# Patient Record
Sex: FEMALE | Race: WHITE | Employment: FULL TIME | URBAN - METROPOLITAN AREA
[De-identification: names, ages, dates, MRNs, and addresses within clinical notes are randomized per-mention and may not be internally consistent; named-entity substitution may affect disease eponyms.]

---

## 2021-08-05 ENCOUNTER — APPOINTMENT (OUTPATIENT)
Dept: CT IMAGING | Facility: CLINIC | Age: 21
End: 2021-08-05
Attending: EMERGENCY MEDICINE
Payer: COMMERCIAL

## 2021-08-05 ENCOUNTER — HOSPITAL ENCOUNTER (EMERGENCY)
Facility: CLINIC | Age: 21
Discharge: HOME | End: 2021-08-06
Attending: EMERGENCY MEDICINE | Admitting: EMERGENCY MEDICINE
Payer: COMMERCIAL

## 2021-08-05 VITALS
DIASTOLIC BLOOD PRESSURE: 92 MMHG | RESPIRATION RATE: 18 BRPM | SYSTOLIC BLOOD PRESSURE: 144 MMHG | OXYGEN SATURATION: 97 % | TEMPERATURE: 98.7 F | HEART RATE: 99 BPM

## 2021-08-05 DIAGNOSIS — R41.89 DECREASED RESPONSIVENESS: ICD-10-CM

## 2021-08-05 DIAGNOSIS — R42 DIZZINESS: ICD-10-CM

## 2021-08-05 DIAGNOSIS — R56.9 SEIZURE-LIKE ACTIVITY (H): ICD-10-CM

## 2021-08-05 DIAGNOSIS — R25.1 EPISODE OF SHAKING: ICD-10-CM

## 2021-08-05 LAB
AMPHETAMINES UR QL SCN: ABNORMAL
ANION GAP SERPL CALCULATED.3IONS-SCNC: 6 MMOL/L (ref 3–14)
ATRIAL RATE - MUSE: 109 BPM
BARBITURATES UR QL: ABNORMAL
BASOPHILS # BLD AUTO: 0.1 10E3/UL (ref 0–0.2)
BASOPHILS NFR BLD AUTO: 1 %
BENZODIAZ UR QL: ABNORMAL
BUN SERPL-MCNC: 10 MG/DL (ref 7–30)
CALCIUM SERPL-MCNC: 9.1 MG/DL (ref 8.5–10.1)
CANNABINOIDS UR QL SCN: ABNORMAL
CHLORIDE BLD-SCNC: 103 MMOL/L (ref 94–109)
CO2 SERPL-SCNC: 27 MMOL/L (ref 20–32)
COCAINE UR QL: ABNORMAL
CREAT SERPL-MCNC: 0.89 MG/DL (ref 0.52–1.04)
DIASTOLIC BLOOD PRESSURE - MUSE: NORMAL MMHG
EOSINOPHIL # BLD AUTO: 0.2 10E3/UL (ref 0–0.7)
EOSINOPHIL NFR BLD AUTO: 2 %
ERYTHROCYTE [DISTWIDTH] IN BLOOD BY AUTOMATED COUNT: 13.7 % (ref 10–15)
ETHANOL SERPL-MCNC: <0.01 G/DL
GFR SERPL CREATININE-BSD FRML MDRD: >90 ML/MIN/1.73M2
GLUCOSE BLD-MCNC: 96 MG/DL (ref 70–99)
HCG SERPL QL: NEGATIVE
HCT VFR BLD AUTO: 37.8 % (ref 35–47)
HGB BLD-MCNC: 11.6 G/DL (ref 11.7–15.7)
HOLD SPECIMEN: NORMAL
IMM GRANULOCYTES # BLD: 0.1 10E3/UL
IMM GRANULOCYTES NFR BLD: 1 %
INTERPRETATION ECG - MUSE: NORMAL
LYMPHOCYTES # BLD AUTO: 3.2 10E3/UL (ref 0.8–5.3)
LYMPHOCYTES NFR BLD AUTO: 24 %
MCH RBC QN AUTO: 24 PG (ref 26.5–33)
MCHC RBC AUTO-ENTMCNC: 30.7 G/DL (ref 31.5–36.5)
MCV RBC AUTO: 78 FL (ref 78–100)
MONOCYTES # BLD AUTO: 1 10E3/UL (ref 0–1.3)
MONOCYTES NFR BLD AUTO: 8 %
NEUTROPHILS # BLD AUTO: 8.6 10E3/UL (ref 1.6–8.3)
NEUTROPHILS NFR BLD AUTO: 64 %
NRBC # BLD AUTO: 0 10E3/UL
NRBC BLD AUTO-RTO: 0 /100
OPIATES UR QL SCN: ABNORMAL
P AXIS - MUSE: 46 DEGREES
PCP UR QL SCN: ABNORMAL
PLATELET # BLD AUTO: 393 10E3/UL (ref 150–450)
POTASSIUM BLD-SCNC: 3.6 MMOL/L (ref 3.4–5.3)
PR INTERVAL - MUSE: 128 MS
QRS DURATION - MUSE: 88 MS
QT - MUSE: 356 MS
QTC - MUSE: 479 MS
R AXIS - MUSE: 50 DEGREES
RBC # BLD AUTO: 4.83 10E6/UL (ref 3.8–5.2)
SODIUM SERPL-SCNC: 136 MMOL/L (ref 133–144)
SYSTOLIC BLOOD PRESSURE - MUSE: NORMAL MMHG
T AXIS - MUSE: 40 DEGREES
TSH SERPL DL<=0.005 MIU/L-ACNC: 2.1 MU/L (ref 0.4–4)
VENTRICULAR RATE- MUSE: 109 BPM
WBC # BLD AUTO: 13.1 10E3/UL (ref 4–11)

## 2021-08-05 PROCEDURE — 96360 HYDRATION IV INFUSION INIT: CPT

## 2021-08-05 PROCEDURE — 84703 CHORIONIC GONADOTROPIN ASSAY: CPT | Performed by: EMERGENCY MEDICINE

## 2021-08-05 PROCEDURE — 80307 DRUG TEST PRSMV CHEM ANLYZR: CPT | Performed by: EMERGENCY MEDICINE

## 2021-08-05 PROCEDURE — 96361 HYDRATE IV INFUSION ADD-ON: CPT

## 2021-08-05 PROCEDURE — 85025 COMPLETE CBC W/AUTO DIFF WBC: CPT | Performed by: EMERGENCY MEDICINE

## 2021-08-05 PROCEDURE — 70450 CT HEAD/BRAIN W/O DYE: CPT

## 2021-08-05 PROCEDURE — 82077 ASSAY SPEC XCP UR&BREATH IA: CPT | Performed by: EMERGENCY MEDICINE

## 2021-08-05 PROCEDURE — 93005 ELECTROCARDIOGRAM TRACING: CPT

## 2021-08-05 PROCEDURE — 84443 ASSAY THYROID STIM HORMONE: CPT | Performed by: EMERGENCY MEDICINE

## 2021-08-05 PROCEDURE — 80048 BASIC METABOLIC PNL TOTAL CA: CPT | Performed by: EMERGENCY MEDICINE

## 2021-08-05 PROCEDURE — 99285 EMERGENCY DEPT VISIT HI MDM: CPT | Mod: 25

## 2021-08-05 PROCEDURE — 250N000013 HC RX MED GY IP 250 OP 250 PS 637: Performed by: EMERGENCY MEDICINE

## 2021-08-05 PROCEDURE — 258N000003 HC RX IP 258 OP 636: Performed by: EMERGENCY MEDICINE

## 2021-08-05 PROCEDURE — 36415 COLL VENOUS BLD VENIPUNCTURE: CPT | Performed by: EMERGENCY MEDICINE

## 2021-08-05 RX ORDER — CLONAZEPAM 0.5 MG/1
0.5 TABLET ORAL ONCE
Status: COMPLETED | OUTPATIENT
Start: 2021-08-05 | End: 2021-08-05

## 2021-08-05 RX ORDER — MECLIZINE HYDROCHLORIDE 25 MG/1
25 TABLET ORAL ONCE
Status: COMPLETED | OUTPATIENT
Start: 2021-08-05 | End: 2021-08-05

## 2021-08-05 RX ORDER — FLUOXETINE 40 MG/1
40 CAPSULE ORAL DAILY
COMMUNITY

## 2021-08-05 RX ORDER — DEXTROAMPHETAMINE SACCHARATE, AMPHETAMINE ASPARTATE, DEXTROAMPHETAMINE SULFATE AND AMPHETAMINE SULFATE 7.5; 7.5; 7.5; 7.5 MG/1; MG/1; MG/1; MG/1
35 TABLET ORAL DAILY
COMMUNITY

## 2021-08-05 RX ORDER — NORGESTIMATE AND ETHINYL ESTRADIOL 7DAYSX3 28
1 KIT ORAL DAILY
COMMUNITY

## 2021-08-05 RX ORDER — MECLIZINE HYDROCHLORIDE 25 MG/1
25-50 TABLET ORAL 4 TIMES DAILY PRN
Qty: 20 TABLET | Refills: 0 | Status: SHIPPED | OUTPATIENT
Start: 2021-08-05

## 2021-08-05 RX ORDER — TRAZODONE HYDROCHLORIDE 100 MG/1
100 TABLET ORAL AT BEDTIME
COMMUNITY

## 2021-08-05 RX ADMIN — MECLIZINE HYDROCHLORIDE 25 MG: 25 TABLET ORAL at 23:26

## 2021-08-05 RX ADMIN — CLONAZEPAM 0.5 MG: 0.5 TABLET ORAL at 21:41

## 2021-08-05 RX ADMIN — CLONAZEPAM 0.5 MG: 0.5 TABLET ORAL at 23:26

## 2021-08-05 RX ADMIN — SODIUM CHLORIDE 1000 ML: 9 INJECTION, SOLUTION INTRAVENOUS at 23:27

## 2021-08-05 RX ADMIN — SODIUM CHLORIDE 1000 ML: 9 INJECTION, SOLUTION INTRAVENOUS at 21:33

## 2021-08-05 ASSESSMENT — ENCOUNTER SYMPTOMS
DYSURIA: 0
PALPITATIONS: 1
HEMATURIA: 0
DIZZINESS: 1
FEVER: 0

## 2021-08-06 NOTE — ED PROVIDER NOTES
History   Chief Complaint:  Dizziness       HPI   Gisselle Buckner is a 21 year old female with history of anxiety, depression and panic attack's who presents with dizziness and altered mental status. The patient reports that she was at a restaurant with her parents when she began to feel dizzy and feel like her upper body was heavy. She reports she slowly lowered herself to the ground. She states she could hear people around her talking but she did not feel like she could respond. Her parents report that she was shaking her head side to side and responding talking a little bit. She notes it felt like her heart was racing. She denies chest pain, fever, dysuria, hematuria or unusual period, headache, vision changes. She denies any new medications or changes in dose of medication. No new diet. She denies family history of seizures. She notes she recently got a new job as a .  Is from [place], originally planning to go to [place] tomorrow.  Has clonazepam prescribed for anxiety related symptoms but this felt different and she has not taken it.      Review of Systems   Constitutional: Negative for fever.   Cardiovascular: Positive for palpitations. Negative for chest pain.   Genitourinary: Negative for dysuria, hematuria and menstrual problem.   Neurological: Positive for dizziness and syncope (Pre syncope).   All other systems reviewed and are negative.    Allergies:  The patient has no known allergies.     Medications:  Prozac  Adderall  Trinessa  Desyrel    Past Medical History:    ADD  Anxiety  Depression  Pneumonia  Bronchitis  Panic attack      Past Surgical History:    Tonsillectomy     Social History:  The patient presents with her parents.  She is a .     Physical Exam     Patient Vitals for the past 24 hrs:   BP Temp Pulse Resp SpO2   08/05/21 2200 -- -- -- 18 97 %   08/05/21 2100 (!) 144/92 -- 99 18 95 %   08/05/21 2054 (!) 144/90 98.7  F (37.1  C) 111 22 96 %       Physical  Exam  Eyes:               Sclera white; Pupils are equal and round                          Eyelids fluttering, eyes will move towards upper right but also simultaneously has the ability to follow my finger through full eye movements before eyes return  ENT:                External ears and nares normal  CV:                  Rate as above with regular rhythm   Resp:               Breath sounds clear and equal bilaterally, occasional breaks in speech with stuttering                          Non-labored, no retractions or accessory muscle use  GI:                   Abdomen is soft, non-tender, non-distended                          No rebound tenderness or peritoneal features  MS:                  Moves all extremities  Skin:                Warm and dry  Neuro:             Speech is understandable. Alert.  Symmetric .  Does not lift either leg off the bed or move toes, states she had another episode, then has full strength for DF and PF.  Has intermittent movements of head back and forth during which time her speech dom is slower.    Emergency Department Course   ECG  ECG taken at 2052, ECG read at 2057  Sinus tachycardia  Nonspecific ST abnormality  Abnormal ECG   Rate 109 bpm. MA interval 128 ms. QRS duration 88 ms. QT/QTc 356/479 ms. P-R-T axes 46 50 40.     Imaging:  CT Head w/o IV contrast:   No acute intracranial process, as per radiology.      Laboratory:  CBC: WBC: 13.1 (H), HGB: 11.6 (L), PLT: 393  BMP: o/w WNL (Creatinine: 0.89)    TSH with Free T4 Reflex: 2.10  HCG Qualitative Blood: Negative  Alcohol ethyl: <0.01  Drug abuse screen 77 urine: Amphetamines: Positive, Benzodiazepines: Positive o/w all negative        Emergency Department Course:    Reviewed:  I reviewed nursing notes, vitals, past medical history and care everywhere    Assessments:  2009 I obtained history and examined the patient as noted above.   2312 I rechecked the patient and explained findings.     Interventions:  2133: NS, 1 L,  IV  2141: clonazepam, 0.5 mg, PO  2326: meclizine 25mg PO  2326: clonazepam 0.5mg PO  0015: completed 2nd NS 1L IV    Disposition:  The patient was discharged to home.       Impression & Plan     Medical Decision Making:  Gisselle Buckner is here for abnormal spells with associated shaking that would be more consistent with pseudoseizures than full epileptic seizures, however focal seizures are on the differential as well. Blood sugar was normal. There are no concerning electrolyte abnormalities, specifically her sodium is normal. She is not pregnant and this is not eclampsia. White blood cell count was mildly elevated but there is no evidence for infection found. CT of the brain shows no mass lesions or bleed. Urine drug screen shows amphetamines, which she is prescribed, and benzodiazepines, which she is prescribed, and also received prior to the urine being tested.  Described some persistent dizziness with sitting up and has mentioned several times how it seems to start with her eyes.  Meclizine tried and prescribed.  At this point she is appropriate for outpatient management and neurology follow up was recommended.  Patient will stay with her parents at an even [facility] and then go back to [place].  Will not travel to [place].  Will follow-up upon return to [place].      Covid-19  Gisselle Buckner was evaluated during a global COVID-19 pandemic, which necessitated consideration that the patient might be at risk for infection with the SARS-CoV-2 virus that causes COVID-19.   Applicable protocols for evaluation were followed during the patient's care.   COVID-19 was considered as part of the patient's evaluation. The plan for testing is:  a test was obtained during this visit.    Diagnosis:    ICD-10-CM    1. Dizziness  R42    2. Episode of shaking  R25.1    3. Decreased responsiveness  R41.89    4. Seizure-like activity (H)  R56.9        Discharge Medications:  New Prescriptions    No medications on file        Scribe Disclosure:  I, Nader Lisa, am serving as a scribe at 9:02 PM on 8/5/2021 to document services personally performed by Radha Phelps MD based on my observations and the provider's statements to me.        Radha Phelps MD  08/06/21 0044

## 2021-08-06 NOTE — ED TRIAGE NOTES
Presents via EMS from hotel. [place]. Reports feeling dizzy while at dinner at 7pm. EMS reports family states her head turned to right with right eye gaze lasted 1-2 minutes pt remained alert and aware while talking. Paramedics administered Versed 2mg IV.

## 2021-08-06 NOTE — DISCHARGE INSTRUCTIONS
If symptoms recur and are mild, try meclizine.  If moderate to severe, take a dose of your clonazepam.    Seek immediate evaluation for:  - fever over 100.4  - repeat episode, especially if not able to talk during it

## 2021-08-12 ENCOUNTER — OFFICE VISIT (OUTPATIENT)
Dept: NEUROLOGY | Facility: CLINIC | Age: 21
End: 2021-08-12
Payer: COMMERCIAL

## 2021-08-12 VITALS
WEIGHT: 233 LBS | RESPIRATION RATE: 16 BRPM | BODY MASS INDEX: 39.3 KG/M2 | HEIGHT: 64.75 IN | SYSTOLIC BLOOD PRESSURE: 124 MMHG | DIASTOLIC BLOOD PRESSURE: 74 MMHG | HEART RATE: 74 BPM

## 2021-08-12 DIAGNOSIS — R29.90 EPISODE OF TRANSIENT NEUROLOGIC SYMPTOMS: Primary | ICD-10-CM

## 2021-08-12 DIAGNOSIS — R56.9 CONVULSIONS, UNSPECIFIED CONVULSION TYPE (HCC): ICD-10-CM

## 2021-08-12 PROCEDURE — 3078F DIAST BP <80 MM HG: CPT | Performed by: OTHER

## 2021-08-12 PROCEDURE — 99203 OFFICE O/P NEW LOW 30 MIN: CPT | Performed by: OTHER

## 2021-08-12 PROCEDURE — 3074F SYST BP LT 130 MM HG: CPT | Performed by: OTHER

## 2021-08-12 PROCEDURE — 3008F BODY MASS INDEX DOCD: CPT | Performed by: OTHER

## 2021-08-12 RX ORDER — CLONAZEPAM 1 MG/1
1 TABLET ORAL NIGHTLY PRN
COMMUNITY
End: 2021-10-11

## 2021-08-12 NOTE — PROGRESS NOTES
[address] [place] [address] with [facility]  8/12/2021    8:24 AM    21 RHF    They were traveling Thursday and has not eaten all day after just donut and strawberries for breakfast.  Brother who was with her threw up drink alcohol and does not use drugs.     FAM: family history includes Diabetes in her maternal grandmother; Heart Disease in her maternal grandfather and maternal grandmother; High Cholesterol in her maternal grandfather; lung cancer in her maternal grandf

## 2021-08-12 NOTE — PROGRESS NOTES
Orthostatic blood pressures obtained per provider request.    Lying B/P: 120/70 Pulse: 84    Standing B/P: 108/74  Pulse: 112

## 2021-09-03 NOTE — H&P
The above referenced H&P was reviewed by Josue Benson MD on 9/23/2021, the patient was examined and no significant changes have occurred in the patient's condition since the H&P was performed.   I discussed with the patient and/or legal representative the po

## 2021-09-20 ENCOUNTER — LAB ENCOUNTER (OUTPATIENT)
Dept: LAB | Facility: HOSPITAL | Age: 21
End: 2021-09-20
Attending: INTERNAL MEDICINE
Payer: COMMERCIAL

## 2021-09-20 DIAGNOSIS — Z01.818 PRE-OP TESTING: ICD-10-CM

## 2021-09-20 RX ORDER — TOPIRAMATE 25 MG/1
50 TABLET ORAL 2 TIMES DAILY
COMMUNITY

## 2021-09-22 LAB — SARS-COV-2 RNA RESP QL NAA+PROBE: NOT DETECTED

## 2021-09-23 ENCOUNTER — HOSPITAL ENCOUNTER (OUTPATIENT)
Dept: INTERVENTIONAL RADIOLOGY/VASCULAR | Facility: HOSPITAL | Age: 21
Discharge: HOME | End: 2021-09-23
Attending: INTERNAL MEDICINE | Admitting: INTERNAL MEDICINE
Payer: COMMERCIAL

## 2021-09-23 VITALS
HEART RATE: 69 BPM | TEMPERATURE: 98 F | SYSTOLIC BLOOD PRESSURE: 135 MMHG | WEIGHT: 235.88 LBS | HEIGHT: 65 IN | RESPIRATION RATE: 16 BRPM | OXYGEN SATURATION: 96 % | BODY MASS INDEX: 39.3 KG/M2 | DIASTOLIC BLOOD PRESSURE: 59 MMHG

## 2021-09-23 DIAGNOSIS — Z01.818 PRE-OP TESTING: Primary | ICD-10-CM

## 2021-09-23 DIAGNOSIS — R55 SYNCOPE: ICD-10-CM

## 2021-09-23 LAB — B-HCG UR QL: NEGATIVE

## 2021-09-23 PROCEDURE — 81025 URINE PREGNANCY TEST: CPT | Performed by: INTERNAL MEDICINE

## 2021-09-23 PROCEDURE — 93660 TILT TABLE EVALUATION: CPT

## 2021-09-23 PROCEDURE — 4A02XFZ MEASUREMENT OF CARDIAC RHYTHM, EXTERNAL APPROACH: ICD-10-PCS | Performed by: INTERNAL MEDICINE

## 2021-09-23 PROCEDURE — 36415 COLL VENOUS BLD VENIPUNCTURE: CPT

## 2021-09-23 PROCEDURE — 4A03XB1 MEASUREMENT OF ARTERIAL PRESSURE, PERIPHERAL, EXTERNAL APPROACH: ICD-10-PCS | Performed by: INTERNAL MEDICINE

## 2021-09-23 RX ORDER — DEXTROAMPHETAMINE SACCHARATE, AMPHETAMINE ASPARTATE, DEXTROAMPHETAMINE SULFATE AND AMPHETAMINE SULFATE 7.5; 7.5; 7.5; 7.5 MG/1; MG/1; MG/1; MG/1
30 TABLET ORAL DAILY
COMMUNITY

## 2021-09-23 RX ORDER — TRAZODONE HYDROCHLORIDE 50 MG/1
50 TABLET ORAL NIGHTLY
COMMUNITY

## 2021-09-23 RX ORDER — NITROGLYCERIN 0.4 MG/1
TABLET SUBLINGUAL
Status: DISCONTINUED
Start: 2021-09-23 | End: 2021-09-23

## 2021-09-23 RX ORDER — ACETAMINOPHEN 500 MG
TABLET ORAL
Status: DISCONTINUED
Start: 2021-09-23 | End: 2021-09-23

## 2021-09-23 RX ORDER — ACETAMINOPHEN 500 MG
500 TABLET ORAL EVERY 6 HOURS PRN
Status: DISCONTINUED | OUTPATIENT
Start: 2021-09-23 | End: 2021-09-23

## 2021-09-23 RX ORDER — FLUOXETINE HYDROCHLORIDE 40 MG/1
40 CAPSULE ORAL DAILY
COMMUNITY
End: 2021-11-24

## 2021-09-23 RX ORDER — DEXTROAMPHETAMINE SACCHARATE, AMPHETAMINE ASPARTATE, DEXTROAMPHETAMINE SULFATE AND AMPHETAMINE SULFATE 1.25; 1.25; 1.25; 1.25 MG/1; MG/1; MG/1; MG/1
TABLET ORAL DAILY
COMMUNITY

## 2021-09-23 RX ADMIN — ACETAMINOPHEN 500 MG: 500 MG TABLET ORAL at 10:40:00

## 2021-09-23 RX ADMIN — NITROGLYCERIN 0.4 MG: 0.4 TABLET SUBLINGUAL at 10:08:00

## 2021-09-23 NOTE — PROCEDURES
CARDIOLOGY DEPARTMENT REPORT TILT TABLE STUDY DATE:   : Quin Ferrell MD   INDICATION: Syncope. BRIEF HISTORY: The patient is a 24year old female  referred for a tilt table study. She  has a previous history of syncope.  Previous cardiac work [facility], [address] [place] [address]  Cardiac Electrophysiology  9/23/2021

## 2021-09-23 NOTE — IVS NOTE
Patient with syncope during tilt table exam. Hx of migraines and was given nitroglycerin\"0.4mg for tilt table procedure. Pt co's headache, Tylenol 500 mg given PO per order. Dr. Chasity Geurrero at bedside to speak with patient and mother.  Discharge instructions giv

## 2021-10-11 PROBLEM — F41.9 ANXIETY: Status: ACTIVE | Noted: 2021-09-16

## 2021-10-11 PROBLEM — R40.4 ALTERED CONSCIOUSNESS: Status: ACTIVE | Noted: 2021-09-16

## 2024-03-28 RX ORDER — ASCORBIC ACID 500 MG
500 TABLET ORAL DAILY
COMMUNITY

## 2024-03-28 RX ORDER — DIPHENHYDRAMINE HCL 25 MG
25 CAPSULE ORAL NIGHTLY PRN
COMMUNITY

## 2024-03-28 RX ORDER — ALBUTEROL SULFATE 90 UG/1
2 AEROSOL, METERED RESPIRATORY (INHALATION) EVERY 4 HOURS PRN
COMMUNITY
Start: 2024-01-03

## 2024-03-28 RX ORDER — ATOMOXETINE 40 MG/1
40 CAPSULE ORAL DAILY
COMMUNITY

## 2024-03-28 RX ORDER — HYDROCODONE BITARTRATE AND ACETAMINOPHEN 5; 325 MG/1; MG/1
1 TABLET ORAL EVERY 6 HOURS PRN
COMMUNITY
Start: 2024-03-25

## 2024-03-28 RX ORDER — GABAPENTIN 100 MG/1
200 CAPSULE ORAL DAILY
COMMUNITY
Start: 2024-03-13

## 2024-03-28 RX ORDER — IBUPROFEN 200 MG
400 TABLET ORAL EVERY 6 HOURS PRN
COMMUNITY

## 2024-03-28 RX ORDER — TAMSULOSIN HYDROCHLORIDE 0.4 MG/1
0.4 CAPSULE ORAL DAILY
COMMUNITY
Start: 2024-03-25

## 2024-03-29 ENCOUNTER — LAB ENCOUNTER (OUTPATIENT)
Dept: LAB | Facility: HOSPITAL | Age: 24
End: 2024-03-29
Attending: UROLOGY
Payer: COMMERCIAL

## 2024-03-29 DIAGNOSIS — Z01.818 PRE-OP TESTING: ICD-10-CM

## 2024-03-29 PROCEDURE — 87635 SARS-COV-2 COVID-19 AMP PRB: CPT

## 2024-03-29 NOTE — DISCHARGE INSTRUCTIONS
Call DR office in 1 week for follow up    HOME INSTRUCTIONS    [facility] HOME CARE INSTRUCTIONS: POST-OP ANESTHESIA  The medication that you received for sedation or general anesthesia can last up to 24 hours. Your judgment and reflexes may be altered, even if you feel like your normal self.      We Recommend:   Do not drive any motor vehicle or bicycle   Avoid mowing the lawn, playing sports, or working with power tools/applicances (power saws, electric knives or mixers)   That you have someone stay with you on your first night home   Do not drink alcohol or take sleeping pills or tranquilizers   Do not sign legal documents within 24 hours of your procedure   If you had a nerve block for your surgery, take extra care not to put any pressure on your arm or hand for 24 hours    It is normal:  For you to have a sore throat if you had a breathing tube during surgery (while you were asleep!). The sore throat should get better within 48 hours. You can gargle with warm salt water (1/2 tsp in 4 oz warm water) or use a throat lozenge for comfort  To feel muscle aches or soreness especially in the abdomen, chest or neck. The achy feeling should go away in the next 24 hours  To feel weak, sleepy or \"wiped out\". Your should start feeling better in the next 24 hours.   To experience mild discomforts such as sore lip or tongue, headache, cramps, gas pains or a bloated feeling in your abdomen.   To experience mild back pain or soreness for a day or two if you had spinal or epidural anesthesia.   If you had laparoscopic surgery, to feel shoulder pain or discomfort on the day of surgery.   For some patients to have nausea after surgery/anesthesia    If you feel nausea or experience vomiting:   Try to move around less.   Eat less than usual or drink only liquids until the next morning   Nausea should resolve in about 24 hours    If you have a problem when you are at home:    Call your surgeons office   Discharge Instructions: After  Your Surgery  You’ve just had surgery. During surgery, you were given medicine called anesthesia to keep you relaxed and free of pain. After surgery, you may have some pain or nausea. This is common. Here are some tips for feeling better and getting well after surgery.   Going home  Your healthcare provider will show you how to take care of yourself when you go home. They'll also answer your questions. Have an adult family member or friend drive you home. For the first 24 hours after your surgery:   Don't drive or use heavy equipment.  Don't make important decisions or sign legal papers.  Take medicines as directed.  Don't drink alcohol.  Have someone stay with you, if needed. They can watch for problems and help keep you safe.  Be sure to go to all follow-up visits with your healthcare provider. And rest after your surgery for as long as your provider tells you to.   Coping with pain  If you have pain after surgery, pain medicine will help you feel better. Take it as directed, before pain becomes severe. Also, ask your healthcare provider or pharmacist about other ways to control pain. This might be with heat, ice, or relaxation. And follow any other instructions your surgeon or nurse gives you.      Stay on schedule with your medicine.     Tips for taking pain medicine  To get the best relief possible, remember these points:   Pain medicines can upset your stomach. Taking them with a little food may help.  Most pain relievers taken by mouth need at least 20 to 30 minutes to start to work.  Don't wait till your pain becomes severe before you take your medicine. Try to time your medicine so that you can take it before starting an activity. This might be before you get dressed, go for a walk, or sit down for dinner.  Constipation is a common side effect of some pain medicines. Call your healthcare provider before taking any medicines such as laxatives or stool softeners to help ease constipation. Also ask if you  should skip any foods. Drinking lots of fluids and eating foods such as fruits and vegetables that are high in fiber can also help. Remember, don't take laxatives unless your surgeon has prescribed them.  Drinking alcohol and taking pain medicine can cause dizziness and slow your breathing. It can even be deadly. Don't drink alcohol while taking pain medicine.  Pain medicine can make you react more slowly to things. Don't drive or run machinery while taking pain medicine.  Your healthcare provider may tell you to take acetaminophen to help ease your pain. Ask them how much you're supposed to take each day. Acetaminophen or other pain relievers may interact with your prescription medicines or other over-the-counter (OTC) medicines. Some prescription medicines have acetaminophen and other ingredients in them. Using both prescription and OTC acetaminophen for pain can cause you to accidentally overdose. Read the labels on your OTC medicines with care. This will help you to clearly know the list of ingredients, how much to take, and any warnings. It may also help you not take too much acetaminophen. If you have questions or don't understand the information, ask your pharmacist or healthcare provider to explain it to you before you take the OTC medicine.   Managing nausea  Some people have an upset stomach (nausea) after surgery. This is often because of anesthesia, pain, or pain medicine, less movement of food in the stomach, or the stress of surgery. These tips will help you handle nausea and eat healthy foods as you get better. If you were on a special food plan before surgery, ask your healthcare provider if you should follow it while you get better. Check with your provider on how your eating should progress. It may depend on the surgery you had. These general tips may help:   Don't push yourself to eat. Your body will tell you when to eat and how much.  Start off with clear liquids and soup. They're easier to  digest.  Next try semi-solid foods as you feel ready. These include mashed potatoes, applesauce, and gelatin.  Slowly move to solid foods. Don’t eat fatty, rich, or spicy foods at first.  Don't force yourself to have 3 large meals a day. Instead eat smaller amounts more often.  Take pain medicines with a small amount of solid food, such as crackers or toast. This helps prevent nausea.  When to call your healthcare provider  Call your healthcare provider right away if you have any of these:   You still have too much pain, or the pain gets worse, after taking the medicine. The medicine may not be strong enough. Or there may be a complication from the surgery.  You feel too sleepy, dizzy, or groggy. The medicine may be too strong.  Side effects such as nausea or vomiting. Your healthcare provider may advise taking other medicines to .  Skin changes such as rash, itching, or hives. This may mean you have an allergic reaction. Your provider may advise taking other medicines.  The incision looks different (for instance, part of it opens up).  Bleeding or fluid leaking from the incision site, and weren't told to expect that.  Fever of 100.4°F (38°C) or higher, or as directed by your provider.  Call 911  Call 911 right away if you have:   Trouble breathing  Facial swelling    If you have obstructive sleep apnea   You were given anesthesia medicine during surgery to keep you comfortable and free of pain. After surgery, you may have more apnea spells because of this medicine and other medicines you were given. The spells may last longer than normal.    At home:  Keep using the continuous positive airway pressure (CPAP) device when you sleep. Unless your healthcare provider tells you not to, use it when you sleep, day or night. CPAP is a common device used to treat obstructive sleep apnea.  Talk with your provider before taking any pain medicine, muscle relaxants, or sedatives. Your provider will tell you about the possible  dangers of taking these medicines.  Contact your provider if your sleeping changes a lot even when taking medicines as directed.  [employer] last reviewed this educational content on 10/1/2021  © 7195-4319 [employer]. All rights reserved. This information is not intended as a substitute for professional medical care. Always follow your healthcare professional's instructions.      Cystoscopy  Cystoscopy is a procedure that lets your healthcare provider look directly inside your urethra and bladder. It can be used to:  Help diagnose a problem with your urethra, bladder, or kidneys.  Take a sample (biopsy) of bladder or urethral tissue.  Treat certain problems (such as removing kidney stones).  Place a tube (stent) to bypass a blockage.  Take special X-rays of the kidneys.  Based on the findings, your provider may advise other tests or treatments.  What is a cystoscope?  A cystoscope is a telescope-like tube that contains lenses and small glass wires that make bright light (fiberoptics). The cystoscope may be straight and rigid. Or it may be flexible to bend around curves in the urethra. The healthcare provider may look directly into the cystoscope, or project the image onto a screen.    Getting ready  Ask your healthcare provider if you should stop taking any medicines before the procedure.  Follow any directions you're given for not eating or drinking before the procedure.  Follow any other instructions your healthcare provider gives you.    Tell your healthcare provider before the exam if you:  Take any medicines, such as aspirin or blood thinners. This also includes any over-the-counter and prescription medicines, vitamins, herbs, and other supplements.  Have allergies to any medicines  Are pregnant or think you may be pregnant    During the procedure  Cystoscopy is done in the healthcare provider’s office, surgery center, or hospital. The doctor and a nurse are present during the procedure. It takes  only a few minutes. It takes longer if a biopsy, X-ray, or treatment needs to be done.  During the procedure:  You lie on an exam table on your back, knees bent and legs apart. You're covered with a drape.  Your urethra and the area around it are washed. Anesthetic jelly may be applied to numb the urethra. Other pain medicine is often not needed. In some cases, you may be offered a mild sedative to help you relax. If a more extensive procedure is to be done, such as a biopsy or kidney stone removal, general anesthesia may be needed. Often a one-time antibiotic is given.  The cystoscope is inserted. A sterile fluid is put into the bladder to expand it. You may feel pressure from this fluid.  When the procedure is done, the cystoscope is removed.  After the procedure  If you had a sedative, general anesthesia, or spinal anesthesia, you must have someone drive you home. Once you’re home:  Drink plenty of fluids.  You may have burning or light bleeding when you pee. This is normal.  Medicines may be prescribed to ease any mild pain or prevent infection. Take these as directed.  When to call your healthcare provider  Call your healthcare provider if you have any of the following after the procedure:  Heavy bleeding or blood clots  Burning that lasts more than 1 day  Fever of   100° F  ( 38°C ) or higher, or as directed by your provider  [employer] last reviewed this educational content on 10/1/2021  © 3111-5282 [employer]. All rights reserved. This information is not intended as a substitute for professional medical care. Always follow your healthcare professional's instructions.

## 2024-03-30 LAB — SARS-COV-2 RNA RESP QL NAA+PROBE: NOT DETECTED

## 2024-04-01 ENCOUNTER — HOSPITAL ENCOUNTER (OUTPATIENT)
Facility: HOSPITAL | Age: 24
Setting detail: HOSPITAL OUTPATIENT SURGERY
Discharge: HOME | End: 2024-04-01
Attending: UROLOGY | Admitting: UROLOGY
Payer: COMMERCIAL

## 2024-04-01 ENCOUNTER — ANESTHESIA EVENT (OUTPATIENT)
Dept: SURGERY | Facility: HOSPITAL | Age: 24
End: 2024-04-01
Payer: COMMERCIAL

## 2024-04-01 ENCOUNTER — ANESTHESIA (OUTPATIENT)
Dept: SURGERY | Facility: HOSPITAL | Age: 24
End: 2024-04-01
Payer: COMMERCIAL

## 2024-04-01 ENCOUNTER — APPOINTMENT (OUTPATIENT)
Dept: GENERAL RADIOLOGY | Facility: HOSPITAL | Age: 24
End: 2024-04-01
Attending: UROLOGY
Payer: COMMERCIAL

## 2024-04-01 VITALS
WEIGHT: 177 LBS | TEMPERATURE: 98 F | BODY MASS INDEX: 29.49 KG/M2 | DIASTOLIC BLOOD PRESSURE: 80 MMHG | SYSTOLIC BLOOD PRESSURE: 145 MMHG | OXYGEN SATURATION: 97 % | RESPIRATION RATE: 16 BRPM | HEIGHT: 65 IN | HEART RATE: 84 BPM

## 2024-04-01 DIAGNOSIS — Z01.818 PRE-OP TESTING: Primary | ICD-10-CM

## 2024-04-01 PROCEDURE — 88300 SURGICAL PATH GROSS: CPT | Performed by: UROLOGY

## 2024-04-01 PROCEDURE — 81025 URINE PREGNANCY TEST: CPT

## 2024-04-01 PROCEDURE — 0TC68ZZ EXTIRPATION OF MATTER FROM RIGHT URETER, VIA NATURAL OR ARTIFICIAL OPENING ENDOSCOPIC: ICD-10-PCS | Performed by: UROLOGY

## 2024-04-01 PROCEDURE — 82365 CALCULUS SPECTROSCOPY: CPT | Performed by: UROLOGY

## 2024-04-01 RX ORDER — PHENAZOPYRIDINE HYDROCHLORIDE 95 MG/1
95 TABLET ORAL 3 TIMES DAILY PRN
Qty: 21 TABLET | Refills: 5 | Status: SHIPPED | OUTPATIENT
Start: 2024-04-01

## 2024-04-01 RX ORDER — HYDROMORPHONE HYDROCHLORIDE 1 MG/ML
0.6 INJECTION, SOLUTION INTRAMUSCULAR; INTRAVENOUS; SUBCUTANEOUS EVERY 5 MIN PRN
Status: DISCONTINUED | OUTPATIENT
Start: 2024-04-01 | End: 2024-04-01

## 2024-04-01 RX ORDER — HYDROMORPHONE HYDROCHLORIDE 1 MG/ML
0.2 INJECTION, SOLUTION INTRAMUSCULAR; INTRAVENOUS; SUBCUTANEOUS EVERY 5 MIN PRN
Status: DISCONTINUED | OUTPATIENT
Start: 2024-04-01 | End: 2024-04-01

## 2024-04-01 RX ORDER — ONDANSETRON 2 MG/ML
4 INJECTION INTRAMUSCULAR; INTRAVENOUS EVERY 6 HOURS PRN
Status: DISCONTINUED | OUTPATIENT
Start: 2024-04-01 | End: 2024-04-01

## 2024-04-01 RX ORDER — NALOXONE HYDROCHLORIDE 0.4 MG/ML
0.08 INJECTION, SOLUTION INTRAMUSCULAR; INTRAVENOUS; SUBCUTANEOUS AS NEEDED
Status: DISCONTINUED | OUTPATIENT
Start: 2024-04-01 | End: 2024-04-01

## 2024-04-01 RX ORDER — OXYCODONE HYDROCHLORIDE 5 MG/1
10 TABLET ORAL EVERY 4 HOURS PRN
Status: DISCONTINUED | OUTPATIENT
Start: 2024-04-01 | End: 2024-04-01

## 2024-04-01 RX ORDER — MORPHINE SULFATE 4 MG/ML
4 INJECTION, SOLUTION INTRAMUSCULAR; INTRAVENOUS EVERY 10 MIN PRN
Status: DISCONTINUED | OUTPATIENT
Start: 2024-04-01 | End: 2024-04-01

## 2024-04-01 RX ORDER — PHENAZOPYRIDINE HYDROCHLORIDE 200 MG/1
200 TABLET, FILM COATED ORAL ONCE AS NEEDED
Status: COMPLETED | OUTPATIENT
Start: 2024-04-01 | End: 2024-04-01

## 2024-04-01 RX ORDER — MORPHINE SULFATE 10 MG/ML
6 INJECTION, SOLUTION INTRAMUSCULAR; INTRAVENOUS EVERY 10 MIN PRN
Status: DISCONTINUED | OUTPATIENT
Start: 2024-04-01 | End: 2024-04-01

## 2024-04-01 RX ORDER — IOPAMIDOL 612 MG/ML
INJECTION, SOLUTION INTRATHECAL AS NEEDED
Status: DISCONTINUED | OUTPATIENT
Start: 2024-04-01 | End: 2024-04-01 | Stop reason: HOSPADM

## 2024-04-01 RX ORDER — SODIUM CHLORIDE, SODIUM LACTATE, POTASSIUM CHLORIDE, CALCIUM CHLORIDE 600; 310; 30; 20 MG/100ML; MG/100ML; MG/100ML; MG/100ML
INJECTION, SOLUTION INTRAVENOUS CONTINUOUS
Status: DISCONTINUED | OUTPATIENT
Start: 2024-04-01 | End: 2024-04-01

## 2024-04-01 RX ORDER — CEFAZOLIN SODIUM/WATER 2 G/20 ML
2 SYRINGE (ML) INTRAVENOUS ONCE
Status: DISCONTINUED | OUTPATIENT
Start: 2024-04-01 | End: 2024-04-01 | Stop reason: HOSPADM

## 2024-04-01 RX ORDER — HYDROMORPHONE HYDROCHLORIDE 1 MG/ML
0.4 INJECTION, SOLUTION INTRAMUSCULAR; INTRAVENOUS; SUBCUTANEOUS EVERY 5 MIN PRN
Status: DISCONTINUED | OUTPATIENT
Start: 2024-04-01 | End: 2024-04-01

## 2024-04-01 RX ORDER — ACETAMINOPHEN 325 MG/1
650 TABLET ORAL
Status: DISCONTINUED | OUTPATIENT
Start: 2024-04-01 | End: 2024-04-01

## 2024-04-01 RX ORDER — CEFADROXIL 500 MG/1
500 CAPSULE ORAL 2 TIMES DAILY
Qty: 6 CAPSULE | Refills: 1 | Status: SHIPPED | OUTPATIENT
Start: 2024-04-01 | End: 2024-04-11

## 2024-04-01 RX ORDER — OXYCODONE HYDROCHLORIDE 5 MG/1
5 TABLET ORAL EVERY 4 HOURS PRN
Status: DISCONTINUED | OUTPATIENT
Start: 2024-04-01 | End: 2024-04-01

## 2024-04-01 RX ORDER — GENTAMICIN SULFATE 40 MG/ML
INJECTION, SOLUTION INTRAMUSCULAR; INTRAVENOUS AS NEEDED
Status: DISCONTINUED | OUTPATIENT
Start: 2024-04-01 | End: 2024-04-01 | Stop reason: HOSPADM

## 2024-04-01 RX ORDER — MORPHINE SULFATE 4 MG/ML
2 INJECTION, SOLUTION INTRAMUSCULAR; INTRAVENOUS EVERY 10 MIN PRN
Status: DISCONTINUED | OUTPATIENT
Start: 2024-04-01 | End: 2024-04-01

## 2024-04-01 RX ORDER — CEFAZOLIN SODIUM/WATER 2 G/20 ML
SYRINGE (ML) INTRAVENOUS AS NEEDED
Status: DISCONTINUED | OUTPATIENT
Start: 2024-04-01 | End: 2024-04-01 | Stop reason: SURG

## 2024-04-01 RX ORDER — PROCHLORPERAZINE EDISYLATE 5 MG/ML
5 INJECTION INTRAMUSCULAR; INTRAVENOUS EVERY 8 HOURS PRN
Status: DISCONTINUED | OUTPATIENT
Start: 2024-04-01 | End: 2024-04-01

## 2024-04-01 RX ORDER — ACETAMINOPHEN 500 MG
1000 TABLET ORAL ONCE
Status: COMPLETED | OUTPATIENT
Start: 2024-04-01 | End: 2024-04-01

## 2024-04-01 RX ORDER — HYDROMORPHONE HYDROCHLORIDE 1 MG/ML
0.8 INJECTION, SOLUTION INTRAMUSCULAR; INTRAVENOUS; SUBCUTANEOUS EVERY 2 HOUR PRN
Status: DISCONTINUED | OUTPATIENT
Start: 2024-04-01 | End: 2024-04-01

## 2024-04-01 RX ORDER — HYDROMORPHONE HYDROCHLORIDE 1 MG/ML
0.4 INJECTION, SOLUTION INTRAMUSCULAR; INTRAVENOUS; SUBCUTANEOUS EVERY 2 HOUR PRN
Status: DISCONTINUED | OUTPATIENT
Start: 2024-04-01 | End: 2024-04-01

## 2024-04-01 RX ADMIN — CEFAZOLIN SODIUM/WATER 2 G: 2 G/20 ML SYRINGE (ML) INTRAVENOUS at 16:52:00

## 2024-04-01 NOTE — BRIEF OP NOTE
Pre-Operative Diagnosis: Obstructing proximal right ureteral stone, right renal colic, right hydronephrosis, hematuria, bilateral kidney stones.     Post-Operative Diagnosis: Same     Procedure Performed:   Cystoscopy, right retrograde pyelogram, fluoroscopy, right ureteroscopy and stone removal.  Surgeon(s) and Role:     * Chandler Alves MD - Primary         Surgical Findings: Urethra was normal.  Bladder was normal.  Right retrograde pyelogram showed a filling defect in the right proximal ureter with mild right hydronephrosis.  There was no extravasation.  Right ureteroscopy showed a stone in the right proximal ureter which was removed in its entirety.  Follow-up x-ray showed no other filling defects and good drainage of the right ureter.     Specimen: Right ureteral stone     Estimated Blood Loss: 0 cc    Dictation Number:  1    CHANDLER ALVES MD  4/1/2024  5:49 PM

## 2024-04-01 NOTE — H&P
Laura Khan is a 23 year old female.  Right renal colic with right proximal ureteral stone. Here for right ureteroscopy, right retrograde pyelogram, right ureteral stone removal, possible laser lithotripsy, possible right stent placement with string.  HPI:   No chief complaint on file.  I saw Laura and her parents in the office semi-emergently on 4/1/24. On Sunday on 3/24/24 developed right renal colic. She went to the emergency room on Monday, 3/25/24. She also had hematuria. She denied fever, chills, or dysuria. She had CT scan of the abdomen and pelvis on 3/25/24 which showed an obstructing 5 mm right proximal calculus causing mild right hydronephrosis. She also had small bilateral nonobstructing renal calculi. She had other incidental findings. She had a small left adnexal cyst or follicle. She also was constipated. She has had intermittent right side and right flank pain over the last week. She has been straining your urine but states she is not passed a stone. Her hematuria is improved. She denies fever or chills. Her last pain episode was yesterday and was a 4/10. Today she feels better. She states he's not had previous urinary tract infections her kidney stones. 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 It is also causing her discomfort.I explained to her interfamily the procedure, risks, and complications. They understand and accept and desires us to proceed. We also discussed at length stone prevention.    History provided by Dr. Grace    Principle Problem:   <principal problem not specified>    Active Problem:   Patient Active Problem List   Diagnosis    Altered consciousness    Anxiety    Attention deficit hyperactivity disorder (ADHD), predominantly inattentive type    Generalized anxiety disorder    Major depressive disorder         HISTORY:  Past Medical History:    Diagnosis Date    ADD (attention deficit disorder) 8th grade    on meds - psych    Anxiety and depression     on meds - psych - meds stable 2017    Anxiety state     Calculus of kidney     Depression     Migraine 08/2021    Migraines     Vaso-vagal reaction 09/2021    Visual impairment     wear glasses      Past Surgical History:   Procedure Laterality Date    TONSILLECTOMY  3 y/o    WISDOM TEETH REMOVED Right       Family History   Problem Relation Age of Onset    Prostate Cancer Father     Diabetes Maternal Grandmother     Heart Disease Maternal Grandmother     High Cholesterol Maternal Grandfather     Heart Disease Maternal Grandfather     Breast Cancer Maternal Grandfather     Other (lung cancer) Maternal Grandfather       Social History:   Social History     Socioeconomic History    Marital status: Single   Tobacco Use    Smoking status: Never    Smokeless tobacco: Never   Vaping Use    Vaping Use: Never used   Substance and Sexual Activity    Alcohol use: Not Currently     Comment: ocassionally    Drug use: No    Sexual activity: Never     Partners: Male     Birth control/protection: Pill   Other Topics Concern    Caffeine Concern Yes     Comment: 1 diet coke a day    Exercise No        Medications :  See nurses notes.  Fluoxetine   levocetirizine  topiramate  trazodone  gabapentin  atomoxetine  Flomax    Allergies:  No Known Allergies      ROS:   CONSTITUTIONAL: denies fever, chills  ENT: denies sore throat, nasal drainage/congestion  CHEST/CVS: denies cough, sputum, trouble breathing/SOB, chest pain, OSWALD, She has a history of asthma  GI: denies abdominal pain, heartburn, diarrhea, blood in bm, tarry bm, . She has occasional constipation.   : denies difficulty urinating, pain, blood in urine, or frequency, .  She does have aHistory of kidney stones  SKIN: denies any unusual skin lesions or rashes  MUSCULOSKELETAL: denies back pain, joint pain, leg swelling  NEURO/EYES: denies headaches, passing out, motor  dysfunction, difficulty walking, difficulty with speech, temporary blindness, double vision, confusion. She has a history of migraine headaches  Hematologic/lymphatic: negative  Behavioral/Psych: ADD, anxiety, depression  Endocrine: negative  Allergic/Immunologic:negative    PHYSICAL EXAM:   Blood pressures 120/90, pulse 60, restaurant rate 16, temperatures 97.2.  Ht 5' 5\" (1.651 m)   Wt 180 lb (81.6 kg)   LMP 03/01/2024   BMI 29.95 kg/m²   GENERAL: alert and orientated X 3, well developed, well nourished, in no apparent distress  HEENT: ears and throat are clear, eyes normal, nose normal, mouth normal  NECK: supple, no lymphadenopathy, thyroid wnl, neck normal  RESPIRATORY: no rales, rhonchi, or wheezes B, lungs clear  CARDIO: RRR without murmur, no murmur, no gallop, normal S1 and S2  ABDOMEN: soft, non-tender with no palpable aneurysm or masses, liver and spleen normal  BACK: normal, no tenderness  FLANK: normal  SKIN: no rashes, no suspicious lesions, warm and dry  EXTREMITIES: no edema, full range of motion, no tenderness, pulse normal  NEURO/PSYCH: orientated x3, normal mood and affect, no sensory or motor deficit, muscle strength normal  :  Adnexa area palpably normal    Laboratory Data:  No results for input(s): \"URINE\", \"CULTI\", \"BLDSMR\" in the last 168 hours.    Chemistries:  No results found for: \"CREATSERUM\"    CrCl cannot be calculated (No successful lab value found.).    No results found for: \"BUN\", \"NA\", \"K\", \"CL\", \"CO2\", \"GLU\", \"CA\", \"ALB\", \"ALKPHO\", \"TP\", \"AST\", \"ALT\"      Hematology:  No results found for: \"WBC\", \"HGB\", \"HCT\", \"PLT\"      Coags:  No results found for: \"PTT\", \"INR\", \"DDIMER\"      No results found for: \"PSA\"        No results for input(s): \"COLORUR\", \"CLARITY\", \"SPECGRAVITY\", \"GLUUR\", \"BILUR\", \"KETUR\", \"BLOODURINE\", \"PHURINE\", \"PROUR\", \"UROBILINOGEN\", \"NITRITE\", \"LEUUR\", \"WBCUR\", \"RBCUR\", \"BACUR\", \"EPIUR\" in the last 168 hours.    No results for input(s): \"URINE\", \"CULTI\",  \"BLDSMR\" in the last 168 hours.     Imaging:   No results found.        Review of previous records: reviewed- if available        ASSESSMENT/PLAN:   Assessment   My assessment is she has a 5 mm right proximal ureteral stone. She has right renal colic. She has mild right hydronephrosis. She has microscopic hematuria. She has bilateral small kidney stones. She has a history of constipation. Her diastolic blood pressure was slightly elevated today.    Principle Problem:   <principal problem not specified>    Active Problem:   Patient Active Problem List   Diagnosis    Altered consciousness    Anxiety    Attention deficit hyperactivity disorder (ADHD), predominantly inattentive type    Generalized anxiety disorder    Major depressive disorder       RECOMMENDATIONS AND TREATMENT PLAN:      She would like to proceed with a cystoscopy, right retrograde pyelogram, right ureteroscopy, right ureteral stone removal, possible laser lithotripsy, and possible right stent placement with string or without. The patient will maintain a thousand milligrams of calcium per day in their diet. Increase citrate in the diet. Lemon juice therapy. Supplement with Citracal if necessary. The patient will try to drink more water targeting 2 ½ liters of urine output per day. The patient should maintain a low oxalate diet and low sodium diet. The patient should modulate the protein in their diet. The patient can increase magnesium in their diet. The patient will do a metabolic stone workup. She will check her blood Pressure as an out- patient follow-up with her primary care physician.     I explained to her and her family the procedure, risks, and complications. She understands and accepts and desires us to proceed.        AGNES ALVES MD

## 2024-04-01 NOTE — INTERVAL H&P NOTE
Pre-op Diagnosis: Kidney stone    The above referenced H&P was reviewed by AGNES ALVES MD on 4/1/2024, the patient was examined and no significant changes have occurred in the patient's condition since the H&P was performed.  I discussed with the patient and/or legal representative the potential benefits, risks and side effects of this procedure; the likelihood of the patient achieving goals; and potential problems that might occur during recuperation.  I discussed reasonable alternatives to the procedure, including risks, benefits and side effects related to the alternatives and risks related to not receiving this procedure.  We will proceed with procedure as planned.

## 2024-04-01 NOTE — ANESTHESIA PREPROCEDURE EVALUATION
Anesthesia PreOp Note    HPI:     Laura Khan is a 23 year old female who presents for preoperative consultation requested by: Chandler Grace MD    Date of Surgery: 4/1/2024    Procedure(s):  Cystoscopy, ureteroscopy, laser lithotripsy, stone removal, stent placement  Laser holmium lithotripsy  Cystoscopy stent insertion  Indication: Kidney stone    Relevant Problems   No relevant active problems       NPO:  Last Liquid Consumption Date: 04/01/24  Last Liquid Consumption Time: 0100  Last Solid Consumption Date: 03/31/24  Last Solid Consumption Time: 2345  Last Liquid Consumption Date: 04/01/24          History Review:  Patient Active Problem List    Diagnosis Date Noted    Altered consciousness 09/16/2021    Anxiety 09/16/2021    Major depressive disorder 03/07/2016    Attention deficit hyperactivity disorder (ADHD), predominantly inattentive type 04/25/2012    Generalized anxiety disorder 11/30/2011       Past Medical History:   Diagnosis Date    ADD (attention deficit disorder) 8th grade    on meds - psych    Anxiety and depression     on meds - psych - meds stable 2017    Anxiety state     Calculus of kidney     Depression     Migraine 08/2021    Migraines     Vaso-vagal reaction 09/2021    Visual impairment     wear glasses       Past Surgical History:   Procedure Laterality Date    TONSILLECTOMY  3 y/o    WISDOM TEETH REMOVED Right        Medications Prior to Admission   Medication Sig Dispense Refill Last Dose    atomoxetine 40 MG Oral Cap Take 1 capsule (40 mg total) by mouth daily.   3/29/2024    albuterol 108 (90 Base) MCG/ACT Inhalation Aero Soln Inhale 2 puffs into the lungs every 4 (four) hours as needed for Wheezing.   3/31/2024 at 2200    gabapentin 100 MG Oral Cap Take 2 capsules (200 mg total) by mouth daily.   4/1/2024 at 0800    ibuprofen (ADVIL) 200 MG Oral Tab Take 2 tablets (400 mg total) by mouth every 6 (six) hours as needed for Pain.   3/27/2024    tamsulosin 0.4 MG Oral Cap Take  1 capsule (0.4 mg total) by mouth daily.   4/1/2024 at 0800    diphenhydrAMINE 25 MG Oral Cap Take 1 capsule (25 mg total) by mouth nightly as needed for Itching.   4/1/2024 at 0100    Multiple Vitamin (DAILY MULTIVITAMIN OR) Take 1 tablet by mouth daily.   Past Week    Vitamin C 500 MG Oral Tab Take 1 tablet (500 mg total) by mouth daily.   Past Week    FLUoxetine 20 MG Oral Cap Take 3 capsules (60 mg total) by mouth daily.   3/31/2024 at 0800    traZODone 50 MG Oral Tab Take 1 tablet (50 mg total) by mouth nightly.   4/1/2024 at 0100    topiramate 25 MG Oral Tab Take 2 tablets (50 mg total) by mouth 2 (two) times daily.   3/22/2024    Levocetirizine Dihydrochloride 5 MG Oral Tab Take 1 tablet (5 mg total) by mouth daily.   Past Month    HYDROcodone-acetaminophen 5-325 MG Oral Tab Take 1 tablet by mouth every 6 (six) hours as needed for Pain.       clonazePAM 0.5 MG Oral Tab Take 1 tablet (0.5 mg total) by mouth 2 (two) times daily as needed for Anxiety. 60 tablet 0 More than a month     Current Facility-Administered Medications Ordered in Epic   Medication Dose Route Frequency Provider Last Rate Last Admin    lactated ringers infusion   Intravenous Continuous Chandler Grace MD 20 mL/hr at 04/01/24 1417 New Bag at 04/01/24 1417    ceFAZolin (Ancef) 2 g in 20mL IV syringe premix  2 g Intravenous Once Chandler Grace MD         No current [facility]-ordered outpatient medications on file.       No Known Allergies    Family History   Problem Relation Age of Onset    Prostate Cancer Father     Diabetes Maternal Grandmother     Heart Disease Maternal Grandmother     High Cholesterol Maternal Grandfather     Heart Disease Maternal Grandfather     Breast Cancer Maternal Grandfather     Other (lung cancer) Maternal Grandfather      Social History     Socioeconomic History    Marital status: Single   Tobacco Use    Smoking status: Never    Smokeless tobacco: Never   Vaping Use    Vaping Use: Never used   Substance and  Sexual Activity    Alcohol use: Not Currently     Comment: ocassionally    Drug use: No    Sexual activity: Never     Partners: Male     Birth control/protection: Pill   Other Topics Concern    Caffeine Concern Yes     Comment: 1 diet coke a day    Exercise No       Available pre-op labs reviewed.             Vital Signs:  Body mass index is 29.45 kg/m².   height is 1.651 m (5' 5\") and weight is 80.3 kg (177 lb). Her oral temperature is 98.6 °F (37 °C). Her blood pressure is 137/72 and her pulse is 89. Her respiration is 18 and oxygen saturation is 98%.   Vitals:    03/28/24 1258 04/01/24 1407   BP:  137/72   Pulse:  89   Resp:  18   Temp:  98.6 °F (37 °C)   TempSrc:  Oral   SpO2:  98%   Weight: 81.6 kg (180 lb) 80.3 kg (177 lb)   Height: 1.651 m (5' 5\") 1.651 m (5' 5\")        Anesthesia Evaluation     Patient summary reviewed    No history of anesthetic complications   Airway   Mallampati: II  TM distance: >3 FB  Neck ROM: full  Dental - Dentition appears grossly intact     Pulmonary - negative ROS and normal exam   (-) asthma, shortness of breath, recent URI  Cardiovascular - negative ROS  Exercise tolerance: good  (-) valvular problems/murmurs, dysrhythmias    Rhythm: regular  Rate: normal    Neuro/Psych    (+)  headaches, anxiety/panic attacks,  attention deficit disorder, depression    (-) seizures, neuromuscular disease    GI/Hepatic/Renal    (+) chronic renal disease  (-) hepatitis, liver disease    Endo/Other - negative ROS   (-) diabetes mellitus, blood dyscrasia  Abdominal                  Anesthesia Plan:   ASA:  2  Plan:   General  Airway:  LMA  Post-op Pain Management: IV analgesics  Informed Consent Plan and Risks Discussed With:  Patient  Discussed plan with:  Surgeon      I have informed Laura Khan and/or legal guardian or family member of the nature of the anesthetic plan, benefits, risks including possible dental damage if relevant, major complications, and any alternative forms of  anesthetic management.   All of the patient's questions were answered to the best of my ability. The patient desires the anesthetic management as planned.  Kimberley Chavis DO  4/1/2024 4:04 PM  Present on Admission:  **None**

## 2024-04-01 NOTE — ANESTHESIA POSTPROCEDURE EVALUATION
Patient: Laura Khan    Procedure Summary       Date: 04/01/24 Room / Location: [facility] MAIN OR  / [facility] MAIN OR    Anesthesia Start: 1652 Anesthesia Stop: 1756    Procedure: Cystoscopy, RIGHT ureteroscopy, RIGHT RETROGRADE PYELOGRAM, RIGHT URETERAL stone removal, (Ureter) Diagnosis: (Kidney stone)    Surgeons: Chandler Grace MD Anesthesiologist: Jeremy Boucher MD    Anesthesia Type: general ASA Status: 2            Anesthesia Type: No value filed.    Vitals Value Taken Time   /67 04/01/24 1752   Temp 98 04/01/24 1756   Pulse 94 04/01/24 1756   Resp 15 04/01/24 1756   SpO2 98 % 04/01/24 1756   Vitals shown include unfiled device data.    [facility] AN Post Evaluation:   Patient Evaluated in PACU  Patient Participation: complete - patient participated  Level of Consciousness: awake  Pain Score: 2  Pain Management: adequate  Airway Patency:patent  Dental exam unchanged from preop  Yes    Cardiovascular Status: hemodynamically stable  Respiratory Status: spontaneous ventilation  Postoperative Hydration euvolemic      Jeremy Boucher MD  4/1/2024 5:56 PM

## 2024-04-02 LAB — B-HCG UR QL: NEGATIVE

## 2024-04-02 NOTE — OPERATIVE REPORT
[facility]    PATIENT'S NAME: LAURA HESTER   ATTENDING PHYSICIAN: Chandler Grace MD   OPERATING PHYSICIAN: Chandler Grace MD   PATIENT ACCOUNT#:   [id number]    LOCATION:  [place] [address]  MEDICAL RECORD #:   [id number]       YOB: 2000  ADMISSION DATE:       04/01/2024      OPERATION DATE:  04/01/2024    OPERATIVE REPORT      PREOPERATIVE DIAGNOSIS:  Obstructing proximal right ureteral stone, right renal colic, right hydronephrosis, hematuria, bilateral kidney stones.  POSTOPERATIVE DIAGNOSIS:  Obstructing proximal right ureteral stone, right renal colic, right hydronephrosis, hematuria, bilateral kidney stones.  PROCEDURE:  Cystoscopy, right retrograde pyelogram, fluoroscopy, right ureteroscopy, and stone removal.     ANESTHESIA:  General.    ESTIMATED BLOOD LOSS:  0 mL.    COMPLICATIONS:  None.    INDICATIONS:  This patient is a 23-year-old female who has had a 1 week history of right renal colic.  Her stone has not passed.  For details, see my dictated history and physical.  She is a  and lives out of town and would like to go home without the stone.  I therefore recommended removal.  I explained to her and her family the procedure, risks, complications.  She understands and accepts , and desires us to proceed.    FINDINGS:  Urethra was normal.  Bladder was normal.  Right retrograde pyelogram showed a filling defect of the right proximal ureter with mild right hydronephrosis.  There was no extravasation.  Right ureteroscopy showed a stone in the right proximal ureter which was removed in its entirety.  Followup x-ray showed no other filling defects and good drainage of the right ureter.    OPERATIVE TECHNIQUE:  Patient brought to the operating theater.  General anesthesia was administered.  She was then carefully prepped and draped in the usual sterile fashion in semi-lithotomy position.  Then, 2 g of Ancef was given IV piggyback.  Next, we did   x-rays, and no calcifications were definitely seen.  Next, we inserted the 22-E[place]ian panendoscope with 30-degree lens videoscope.  The urethra was entered.  it was normal.  Bladder was entered and emptied.  There was minimal residual urine which was clear.  The bladder was carefully surveilled with 30-degree and 70-degree lenses.  Both ureters were identified.  There was no evidence of bleeding, blood clot, or stones.  Next, I used a whistle-tip catheter and cannulated just the right distal ureter.  Carefully, we instilled contrast gently.  This was mixed with saline and gentamicin.  This opacified the system well revealing possible filling defect in the right proximal ureter and mild right hydroureteronephrosis above this.  Next, we inserted a 0.038 Glidewire.  We then put in the 5/15 balloon dilator and dilated the right distal ureter for 1 minute at a pressure of 10 cm of water.  We backed down the balloon, deflated the balloon and removed it carefully.  We left the wire and this was safety.  Next, we used the rigid ureteroscope, passed this up gently.  We had complete visualization the entire time.  When we got to the proximal right ureter, we saw a large stone.  This was grasped with a 4-wire basket and carefully brought behind the ureteroscope and gently brought down through.  We had the laser on standby but did not need the laser because the stone was able to be pulled out with relatively dilated ureter.  The stone was removed in its entirety.  We then looked up with ureteroscope all the way to the kidney.  There was no significant trauma to the right ureter, and there was no swelling or edema, and I felt we could probably not need a stent.  We put contrast through the ureteroscope after draining the right collecting system gently.  This opacified the system well.  There were no other filling defects, extravasation or abnormalities.  We then removed the ureteroscope.  Wire was left in place.  We then removed  the wire.  At this point, we used the cystoscope.  We emptied the bladder, and I watched the ureter drain well on the right.  The contrast drained well from the right ureter, and I felt that maybe we could go without a stent and she wanted to go home sooner rather than later, and did not want to stay around for several days to remove the stent in the office.  Because she lives out of town, she will not have to remove the stent by herself if she went home early.  So, therefore, I decided not to leave the stent as I felt the ureter looked very good.  There was no trauma, and it seemed like her ureter emptied well.  She understands that I may have to put up a stent if she develops renal colic or due to a blood clot.  At this point, the bladder was emptied.  The scope was removed.  The patient tolerated the procedure well and was transferred to recovery room in stable condition.    FINAL ASSESSMENT:  Successful removal of right proximal ureteral stones.  She has tiny bilateral small kidney stones which were not well seen and maybe intraparenchymal.     PLAN:   Will be to see how she does clinically.  She can take Tylenol p.r.n., Azo p.r.n.  We will send her stone for analysis.  She should get a metabolic stone workup.  She will get a followup basic metabolic profile, a KUB, and an ultrasound in 3 months, and she can see me 3 months or call me in 1 week to let me know how she is doing and then she will call me at anytime if she has problems.  She will finish a short course of antibiotics for 3 days and take Azo and Tylenol p.r.n.     Dictated By Chandler Grace MD  d: 04/01/2024 17:58:30  t: 04/02/2024 02:16:33  Job [id number]  GJK/    cc: Paola Farley,

## 2024-04-08 LAB
CAOX DIHYDRATE: 30 %
CAOX MONOHYDRATE: 65 %
HYDROXYAPATITE: 5 %
WEIGHT-STONE: 45 MG

## (undated) DEVICE — PAD,EYE,LARGE,2 1/8"X2 5/8",STERILE,LF: Brand: MEDLINE

## (undated) DEVICE — CYSTO PACK: Brand: MEDLINE INDUSTRIES, INC.

## (undated) DEVICE — SOLUTION IRRIG 1000ML 0.9% NACL USP BTL

## (undated) DEVICE — GAMMEX® PI HYBRID SIZE 8.5, STERILE POWDER-FREE SURGICAL GLOVE, POLYISOPRENE AND NEOPRENE BLEND: Brand: GAMMEX

## (undated) DEVICE — OR TOWEL, 17" X 26" STERILE, BLUE: Brand: PREMIERPRO

## (undated) DEVICE — 3 ML SYRINGE LUER-LOCK TIP: Brand: MONOJECT

## (undated) DEVICE — JELLY,LUBE,STERILE,FLIP TOP,TUBE,2-OZ: Brand: MEDLINE

## (undated) DEVICE — FIBER LASER 200 MICRON

## (undated) DEVICE — SLEEVE COMPR MD KNEE LEN SGL USE KENDALL SCD

## (undated) DEVICE — ZIPWIRE GUIDEWIRE .038X150 STR

## (undated) DEVICE — ISOVUE-300. 61% INFUS BTL

## (undated) DEVICE — ENDOSCOPIC VALVE WITH ADAPTER.: Brand: SURSEAL® II

## (undated) DEVICE — NITINOL STONE RETRIEVAL BASKET: Brand: ZERO TIP

## (undated) DEVICE — SOLUTION IRRIG 1000ML ST H2O AQUALITE PLAS

## (undated) DEVICE — ZIPWIRE .038IN ANG TIP BX 5

## (undated) DEVICE — GUIDEWIRE URO L150CM DIA0.035IN TAPR 3CM NIT

## (undated) DEVICE — SERVICE RENTAL LSR TECH ONLY

## (undated) DEVICE — SOLUTION IRRIG 3000ML 0.9% NACL FLX CONT

## (undated) DEVICE — BALLOON DILATATION CATHETER KIT: Brand: UROMAX ULTRA KIT